# Patient Record
Sex: FEMALE | Race: WHITE | NOT HISPANIC OR LATINO
[De-identification: names, ages, dates, MRNs, and addresses within clinical notes are randomized per-mention and may not be internally consistent; named-entity substitution may affect disease eponyms.]

---

## 2022-08-22 PROBLEM — Z00.00 ENCOUNTER FOR PREVENTIVE HEALTH EXAMINATION: Status: ACTIVE | Noted: 2022-08-22

## 2022-09-07 ENCOUNTER — APPOINTMENT (OUTPATIENT)
Dept: ORTHOPEDIC SURGERY | Facility: CLINIC | Age: 22
End: 2022-09-07

## 2022-09-07 VITALS — WEIGHT: 215 LBS | HEIGHT: 65 IN | BODY MASS INDEX: 35.82 KG/M2

## 2022-09-07 DIAGNOSIS — Z78.9 OTHER SPECIFIED HEALTH STATUS: ICD-10-CM

## 2022-09-07 DIAGNOSIS — M54.50 LOW BACK PAIN, UNSPECIFIED: ICD-10-CM

## 2022-09-07 DIAGNOSIS — M47.817 SPONDYLOSIS W/OUT MYELOPATHY OR RADICULOPATHY, LUMBOSACRAL REGION: ICD-10-CM

## 2022-09-07 PROCEDURE — 99203 OFFICE O/P NEW LOW 30 MIN: CPT

## 2022-09-07 PROCEDURE — 72100 X-RAY EXAM L-S SPINE 2/3 VWS: CPT

## 2022-09-07 RX ORDER — ISOTRETINOIN 30 MG/1
CAPSULE, GELATIN COATED ORAL
Refills: 0 | Status: ACTIVE | COMMUNITY

## 2022-09-07 NOTE — DISCUSSION/SUMMARY
[de-identified] : We discussed further treatment options.  She has not had much in the way of treatment for symptoms.  She will try course of physical therapy.  MRI if not improved or worsen.

## 2022-09-07 NOTE — PHYSICAL EXAM
[de-identified] : Examination of the lumbar spine reveals no midline tenderness palpation, step-offs, or skin lesions. Decreased range of motion with respect to flexion, extension, lateral bending, and rotation. No tenderness to palpation of the sciatic notch. No tenderness palpation of the bilateral greater trochanters. No pain with passive internal/external rotation of the hips. No instability of bilateral lower extremities.  Negative JENNIFER. Negative straight leg raise bilaterally. No bowstring. Negative femoral stretch. 5 out of 5 iliopsoas, hip abductors, hips adductors, quadriceps, hamstrings, gastrocsoleus, tibialis anterior, extensor hallucis longus, peroneals. Grossly intact sensation to light touch bilateral lower extremities. 1+ patellar and Achilles reflexes. Downgoing Babinski. No clonus. Intact proprioception. Palpable pulses. No skin lesion and no edema on the right and left lower extremities. [de-identified] : AP lateral lumbar x-rays with some increased spondylosis at L5-S1.  No gross instability or aggressive lesions.

## 2022-09-07 NOTE — HISTORY OF PRESENT ILLNESS
[de-identified] : Ms. Matilde Mcconnell  is a 21 year old female who presents with 4-6 weeks of low back pain without any specific cause or trauma.  Denies any LE radicular symptoms.  Normal bowel and bladder control.   Denies any recent fevers, chills, sweats, weight loss, or infection.  She has tried nsaids with minimal relief.  Sometimes she will get left lateral thigh pain but it is minimal. \par \par The patients past medical history, past surgical history, medications, allergies, and social history were reviewed by me today with the patient and documented accordingly.  In addition, the patient's family history, which is noncontributory to their visit, was also reviewed.\par

## 2022-09-26 ENCOUNTER — APPOINTMENT (OUTPATIENT)
Dept: ORTHOPEDIC SURGERY | Facility: CLINIC | Age: 22
End: 2022-09-26

## 2022-09-26 ENCOUNTER — NON-APPOINTMENT (OUTPATIENT)
Age: 22
End: 2022-09-26

## 2022-09-26 VITALS
BODY MASS INDEX: 36.32 KG/M2 | HEIGHT: 65 IN | SYSTOLIC BLOOD PRESSURE: 107 MMHG | HEART RATE: 80 BPM | DIASTOLIC BLOOD PRESSURE: 70 MMHG | WEIGHT: 218 LBS

## 2022-09-26 DIAGNOSIS — M79.646 PAIN IN UNSPECIFIED FINGER(S): ICD-10-CM

## 2022-09-26 DIAGNOSIS — S62.662A NONDISPLACED FRACTURE OF DISTAL PHALANX OF RIGHT MIDDLE FINGER, INITIAL ENCOUNTER FOR CLOSED FRACTURE: ICD-10-CM

## 2022-09-26 PROCEDURE — 99214 OFFICE O/P EST MOD 30 MIN: CPT

## 2022-09-26 PROCEDURE — 73140 X-RAY EXAM OF FINGER(S): CPT | Mod: F7

## 2022-09-26 NOTE — HISTORY OF PRESENT ILLNESS
[de-identified] : MYLENE ROMAN  is a 21 year year old right-hand-dominant F who presents with right middle finger injury.  She says 2 weeks ago she was playing with her dog when she injured her right middle finger.  She does not remember specifically out of the finger was injured but says that immediately after which she had pain and swelling of the middle finger around the distal phalanx.  She went to urgent care at that time and was told that she may have a fracture of the distal phalanx and was given AlumaFoam splint.  Since then she says that she continues to have pain in the middle finger at the distal phalanx which she describes it about a 7 out of 10 when she moves the finger.  Of note she said that she did have an injury to the middle finger when she was a kid about 10 years old but she did not have to have surgery at that time.

## 2022-09-26 NOTE — DISCUSSION/SUMMARY
[4 Weeks] : in 4 weeks [de-identified] : Right middle finger distal phalanx base radial avulsion.  Discussed with patient that this appears to be a stable injury which likely will not require surgery.  We discussed that she should continue immobilization with the AlumaFoam splint to keep the DIP joint stable.  She should wear the splint when out of the house or at home as needed for comfort.  However she should come out of the splint a few times a day and gently make a fist and release it in order to preserve range of motion of the finger.  She should follow-up in 4 weeks with repeat x-rays and repeat evaluation.  The patient expressed understanding of her diagnosis and treatment plan and all questions were answered.

## 2022-09-26 NOTE — PHYSICAL EXAM
[de-identified] : General: No apparent distress\par Cardiovascular: extremities warm and well-perfused, no cyanosis\par Pulmonary: non labored respirations\par \par Right hand:\par Swelling and tenderness to palpation about middle finger DIP joint.\par Pain with varus stress/ulnar deviation of the DIP joint\par Mild pain along middle phalanx\par No pain with radial deviation of DIP joint\par Cap refill less than 2 seconds\par Sensation intact to light touch throughout distal phalanx and finger [de-identified] : 3 views of the right middle finger obtained today and interpreted by me demonstrate nondisplaced avulsion of the distal phalanx radial base

## 2022-10-28 ENCOUNTER — APPOINTMENT (OUTPATIENT)
Dept: ORTHOPEDIC SURGERY | Facility: CLINIC | Age: 22
End: 2022-10-28

## 2022-10-28 VITALS
WEIGHT: 215 LBS | HEIGHT: 65 IN | BODY MASS INDEX: 35.82 KG/M2 | SYSTOLIC BLOOD PRESSURE: 102 MMHG | DIASTOLIC BLOOD PRESSURE: 69 MMHG | HEART RATE: 76 BPM

## 2022-10-28 DIAGNOSIS — M25.561 PAIN IN RIGHT KNEE: ICD-10-CM

## 2022-10-28 DIAGNOSIS — M25.562 PAIN IN RIGHT KNEE: ICD-10-CM

## 2022-10-28 PROCEDURE — 99214 OFFICE O/P EST MOD 30 MIN: CPT

## 2022-10-28 PROCEDURE — 73564 X-RAY EXAM KNEE 4 OR MORE: CPT | Mod: LT

## 2022-10-28 PROCEDURE — 73130 X-RAY EXAM OF HAND: CPT | Mod: RT
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
## 2022-10-29 NOTE — DISCUSSION/SUMMARY
[de-identified] : Right middle finger distal phalanx fracture, healing and bilateral knee pain, right worse than left.  I discussed with her that in regards to the right middle finger, her fracture appears stable and is healing.  She may return to using the right hand for all her activities.  In regards to her bilateral knee pain, I discussed with her that on the right side which is worse she has significant tenderness palpation about the medial joint line, which could indicate a meniscus tear.  On the left side she has lateral joint line tenderness palpation as well as patellar apprehension.  This could indicate either a meniscus tear or symptomatic maltracking of the patella.  I discussed with her that since she has tried physical therapy with no improvement in the pain and her pain has continued to worsen and significantly limiting her activities, I recommend an MRI of the bilateral knees to evaluate this further.  Once the MRIs are complete, I will be able to give her a treatment plan.  The patient expressed understanding of the diagnosis and treatment plan and all questions were answered.\par \par This note was generated using dragon medical dictation software.  A reasonable effort has been made for proofreading its contents, but typos may still remain.  If there are any questions or points of clarification needed please notify my office.\par \par

## 2022-10-29 NOTE — PHYSICAL EXAM
[de-identified] : General: No apparent distress\par Cardiovascular: extremities warm and well-perfused, no cyanosis\par Pulmonary: non labored respirations\par \par Right hand:\par Right middle finger distal phalanx is nontender to palpation\par DIP joint of middle finger is stable to radial and ulnar stress\par Mild swelling about distal phalanx\par \par \par Left Hip:\par Able to flex past 90, negative FADIR and JENNIFER\par \par Left knee:\par Skin is intact, warm, and dry\par Toes motor and sensory intact, toes warm and well perfused\par \par Disuse atrophy: None\par Effusion: None\par \par Active ROM: 0 to 130 degrees of flexion\par Passive ROM: Same as active\par Crepitation: None\par \par Joint Line tenderness: Lateral joint line tenderness to palpation\par Patellar facet tenderness: Mild tenderness palpation about distal pole patella\par Patellar tracking: Slight lateral tracking\par Patellar mobility/apprehension: Moderate apprehension with lateral translation of the patella with 2-3 quadrants patellar glide\par \par Ligamentous Exam:\par Posterior drawer, stable to varus and valgus stress\par \par Alignment: Valgus\par \par Right Hip:\par Able to flex past 90 degrees, negative FADIR and JENNIFER\par \par Right Knee: \par Skin is intact, warm, and dry\par Motor and sensory intact, toes warm well perfused\par \par Disuse atrophy: None\par Effusion: None\par \par Active ROM: 0 to 130 degrees of flexion\par Passive ROM: Same as active\par Crepitation: None\par \par Joint Line tenderness: Medial joint line tenderness to palpation\par Patellar facet tenderness: None\par Patellar tracking: Normal\par Patellar mobility/apprehension: Mild apprehension with lateral translation, 2 quadrants lateral patellar glide\par \par Ligamentous Exam:\par Stable to varus and valgus stress, negative anterior drawer and Lachman\par \par Alignment: Valgus [de-identified] : 3 views of the right middle finger obtained today and interpreted by me demonstrate healing distal phalanx radial base fracture\par \par 4 views of the right knee obtained today and interpreted by me demonstrate no acute fracture or dislocation.  There are no degenerative changes..  Patella xavi\par \par 4 views of the left knee obtained today and interpreted by me demonstrate no acute fracture or dislocation.  There are no degenerative changes..  Patella xavi\par \par

## 2022-11-07 ENCOUNTER — TRANSCRIPTION ENCOUNTER (OUTPATIENT)
Age: 22
End: 2022-11-07

## 2022-12-05 ENCOUNTER — NON-APPOINTMENT (OUTPATIENT)
Age: 22
End: 2022-12-05

## 2022-12-12 ENCOUNTER — APPOINTMENT (OUTPATIENT)
Dept: ORTHOPEDIC SURGERY | Facility: CLINIC | Age: 22
End: 2022-12-12

## 2022-12-12 VITALS
DIASTOLIC BLOOD PRESSURE: 85 MMHG | TEMPERATURE: 98.1 F | HEIGHT: 65 IN | SYSTOLIC BLOOD PRESSURE: 154 MMHG | WEIGHT: 220 LBS | HEART RATE: 86 BPM | OXYGEN SATURATION: 98 % | BODY MASS INDEX: 36.65 KG/M2

## 2022-12-12 DIAGNOSIS — M54.16 RADICULOPATHY, LUMBAR REGION: ICD-10-CM

## 2022-12-12 PROCEDURE — 99214 OFFICE O/P EST MOD 30 MIN: CPT

## 2022-12-12 NOTE — HISTORY OF PRESENT ILLNESS
[de-identified] : Ms. Matilde Mcconnell  is a 21 year old female who presents to the office for a follow-up visit.  She has been doing physical therapy since September with minimal relief.  She has right lumbar and buttock pain. \par

## 2022-12-12 NOTE — PHYSICAL EXAM
[de-identified] : Examination of the lumbar spine reveals no midline tenderness palpation, step-offs, or skin lesions. Decreased range of motion with respect to flexion, extension, lateral bending, and rotation. No tenderness to palpation of the sciatic notch. No tenderness palpation of the bilateral greater trochanters. No pain with passive internal/external rotation of the hips. No instability of bilateral lower extremities.  Negative JENNIFER. Negative straight leg raise bilaterally. No bowstring. Negative femoral stretch. 5 out of 5 iliopsoas, hip abductors, hips adductors, quadriceps, hamstrings, gastrocsoleus, tibialis anterior, extensor hallucis longus, peroneals. Grossly intact sensation to light touch bilateral lower extremities. 1+ patellar and Achilles reflexes. Downgoing Babinski. No clonus. Intact proprioception. Palpable pulses. No skin lesion and no edema on the right and left lower extremities. [de-identified] : AP lateral lumbar x-rays with some increased spondylosis at L5-S1.  No gross instability or aggressive lesions.

## 2022-12-12 NOTE — DISCUSSION/SUMMARY
[de-identified] : We discussed further treatment options.  She has worsening radicular complaints despite physical therapy.  We will obtain a lumbar spine MRI.  Follow-up afterwards.

## 2023-01-18 ENCOUNTER — OUTPATIENT (OUTPATIENT)
Dept: OUTPATIENT SERVICES | Facility: HOSPITAL | Age: 23
LOS: 1 days | End: 2023-01-18
Payer: MEDICAID

## 2023-01-18 ENCOUNTER — APPOINTMENT (OUTPATIENT)
Dept: MRI IMAGING | Facility: CLINIC | Age: 23
End: 2023-01-18
Payer: MEDICAID

## 2023-01-18 DIAGNOSIS — Z00.00 ENCOUNTER FOR GENERAL ADULT MEDICAL EXAMINATION WITHOUT ABNORMAL FINDINGS: ICD-10-CM

## 2023-01-18 PROCEDURE — 72148 MRI LUMBAR SPINE W/O DYE: CPT | Mod: 26

## 2023-01-18 PROCEDURE — 72148 MRI LUMBAR SPINE W/O DYE: CPT

## 2023-01-23 ENCOUNTER — NON-APPOINTMENT (OUTPATIENT)
Age: 23
End: 2023-01-23

## 2023-03-03 ENCOUNTER — APPOINTMENT (OUTPATIENT)
Dept: MRI IMAGING | Facility: CLINIC | Age: 23
End: 2023-03-03
Payer: MEDICAID

## 2023-03-03 ENCOUNTER — OUTPATIENT (OUTPATIENT)
Dept: OUTPATIENT SERVICES | Facility: HOSPITAL | Age: 23
LOS: 1 days | End: 2023-03-03
Payer: MEDICAID

## 2023-03-03 DIAGNOSIS — Z00.8 ENCOUNTER FOR OTHER GENERAL EXAMINATION: ICD-10-CM

## 2023-03-03 PROCEDURE — 73721 MRI JNT OF LWR EXTRE W/O DYE: CPT | Mod: 26,LT

## 2023-03-03 PROCEDURE — 73721 MRI JNT OF LWR EXTRE W/O DYE: CPT

## 2023-03-06 ENCOUNTER — NON-APPOINTMENT (OUTPATIENT)
Age: 23
End: 2023-03-06

## 2023-03-22 ENCOUNTER — APPOINTMENT (OUTPATIENT)
Dept: ORTHOPEDIC SURGERY | Facility: CLINIC | Age: 23
End: 2023-03-22
Payer: MEDICAID

## 2023-03-22 VITALS — HEART RATE: 94 BPM | SYSTOLIC BLOOD PRESSURE: 113 MMHG | DIASTOLIC BLOOD PRESSURE: 73 MMHG

## 2023-03-22 PROCEDURE — 99213 OFFICE O/P EST LOW 20 MIN: CPT

## 2023-03-22 RX ORDER — NAPROXEN 500 MG/1
500 TABLET ORAL
Qty: 20 | Refills: 0 | Status: ACTIVE | COMMUNITY
Start: 2023-03-22 | End: 1900-01-01

## 2023-06-12 ENCOUNTER — APPOINTMENT (OUTPATIENT)
Dept: ORTHOPEDIC SURGERY | Facility: CLINIC | Age: 23
End: 2023-06-12
Payer: MEDICAID

## 2023-06-12 VITALS — SYSTOLIC BLOOD PRESSURE: 124 MMHG | DIASTOLIC BLOOD PRESSURE: 84 MMHG | HEART RATE: 82 BPM

## 2023-06-12 PROCEDURE — 99213 OFFICE O/P EST LOW 20 MIN: CPT

## 2023-06-12 RX ORDER — NAPROXEN 500 MG/1
500 TABLET ORAL
Qty: 20 | Refills: 1 | Status: ACTIVE | COMMUNITY
Start: 2023-06-12 | End: 1900-01-01

## 2023-06-12 NOTE — DISCUSSION/SUMMARY
[de-identified] : Bilateral anterior knee pain, patellofemoral pain syndrome versus patellar tendon friction syndrome.  Discussed with her that it is encouraging that the physical therapy and anti-inflammatories have improved her pain.  We discussed that this is more of a dynamic phenomenon that often does not show up very well on MRIs.  There is not a great surgical solution as this represents variations of underlying anatomy that can predispose her to anterior knee pain.  I recommend continued nonoperative treatment with continued physical therapy.  I will also prescribe her another course of naproxen 500 mg twice daily for the next 10 days to help her with physical therapy.  We discussed that she will likely have to do some exercises long-term in order to help keep the pain at bay.  If she still has pain after that time she may come back and see me for repeat evaluation.

## 2023-06-12 NOTE — HISTORY OF PRESENT ILLNESS
[de-identified] : MYLENE ROMAN  is a 22 year year old F who presents for follow-up of her bilateral knees.  She reports that she has been doing physical therapy and did take the naproxen as prescribed and says that these have helped her.  The pain is not as bad as it was at the last visit, but she still does experience anterior knee pain bilaterally.  The right side is worse than the left side.

## 2023-06-12 NOTE — PHYSICAL EXAM
[de-identified] : General: No apparent distress\par Cardiovascular: extremities warm and well-perfused, no cyanosis\par Pulmonary: non labored respirations\par \par \par Left knee:\par Skin is intact, warm, and dry\par motor and sensory intact, toes warm and well perfused\par \par Disuse atrophy: None\par Effusion: None\par \par Active ROM: 0 to 130 degrees of flexion\par Passive ROM: Same as active\par Crepitation: None\par \par Joint Line tenderness: Lateral joint line tenderness to palpation\par Patellar facet tenderness: tenderness to palpation about distal pole patella\par Patellar tracking: Slight lateral tracking\par Patellar mobility/apprehension: 2 quadrants lateral glide, minimal apprehension\par \par Ligamentous Exam:\par Anterior drawer, Lachman posterior drawer negative, stable to varus and valgus stress\par \par Alignment: Valgus\par \par Right Knee: \par Skin is intact, warm, and dry\par motor and sensory intact, toes warm and well perfused\par \par Disuse atrophy: None\par Effusion: None\par \par Active ROM: 0 to 130 degrees of flexion\par Passive ROM: Same as active\par Crepitation: None\par \par Joint Line tenderness: Lateral joint line tenderness to palpation\par Patellar facet tenderness: tenderness to palpation about distal pole patella\par Patellar tracking: Slight lateral tracking\par Patellar mobility/apprehension: 2 quadrants lateral glide, minimal apprehension\par \par Ligamentous Exam:\par Anterior drawer, Lachman posterior drawer negative, stable to varus and valgus stress\par \par Alignment: Valgus

## 2023-09-29 ENCOUNTER — APPOINTMENT (OUTPATIENT)
Age: 23
End: 2023-09-29
Payer: COMMERCIAL

## 2023-09-29 PROCEDURE — D0367: CPT

## 2023-09-29 PROCEDURE — D9310: CPT

## 2024-03-29 ENCOUNTER — APPOINTMENT (OUTPATIENT)
Age: 24
End: 2024-03-29
Payer: SELF-PAY

## 2024-03-29 PROCEDURE — D0367: CPT

## 2024-03-29 PROCEDURE — 99024 POSTOP FOLLOW-UP VISIT: CPT

## 2024-07-03 ENCOUNTER — TRANSCRIPTION ENCOUNTER (OUTPATIENT)
Age: 24
End: 2024-07-03

## 2025-07-03 ENCOUNTER — APPOINTMENT (OUTPATIENT)
Age: 25
End: 2025-07-03
Payer: MEDICAID

## 2025-07-03 PROCEDURE — D9310: CPT

## 2025-07-03 PROCEDURE — D0330 PANORAMIC RADIOGRAPHIC IMAGE: CPT

## 2025-07-17 ENCOUNTER — APPOINTMENT (OUTPATIENT)
Age: 25
End: 2025-07-17
Payer: COMMERCIAL

## 2025-07-17 PROCEDURE — D9223: CPT

## 2025-07-17 PROCEDURE — D9222: CPT

## 2025-07-17 PROCEDURE — D7230: CPT

## 2025-07-24 ENCOUNTER — APPOINTMENT (OUTPATIENT)
Age: 25
End: 2025-07-24
Payer: MEDICAID

## 2025-07-24 PROCEDURE — D0170: CPT | Mod: NC
